# Patient Record
(demographics unavailable — no encounter records)

---

## 2017-11-22 DIAGNOSIS — Z51.81 MEDICATION MONITORING ENCOUNTER: Primary | ICD-10-CM

## 2017-11-22 RX ORDER — SPIRONOLACTONE 50 MG/1
TABLET, FILM COATED ORAL
Qty: 60 TABLET | Refills: 10 | OUTPATIENT
Start: 2017-11-22

## 2017-11-22 NOTE — TELEPHONE ENCOUNTER
I need to see her back - it's been over a year since her last appt and she needs labs done too. If she gets labs done (renal) and they are OK, I can refill until her f/u in a few mos if she schedules.

## 2017-12-15 RX ORDER — SPIRONOLACTONE 50 MG/1
TABLET, FILM COATED ORAL
Qty: 60 TABLET | Refills: 4 | Status: SHIPPED | OUTPATIENT
Start: 2017-12-15

## 2018-02-20 DIAGNOSIS — Z51.81 MEDICATION MONITORING ENCOUNTER: Primary | ICD-10-CM

## 2018-02-20 RX ORDER — SPIRONOLACTONE 50 MG/1
TABLET, FILM COATED ORAL
Qty: 60 TABLET | Refills: 4 | OUTPATIENT
Start: 2018-02-20

## 2018-02-21 NOTE — TELEPHONE ENCOUNTER
Komal at [facility] - confirmed not too sure why we got the request.     Patient has 3 RF left after picking up rx yesterday.